# Patient Record
Sex: FEMALE | Race: WHITE | Employment: OTHER | URBAN - METROPOLITAN AREA
[De-identification: names, ages, dates, MRNs, and addresses within clinical notes are randomized per-mention and may not be internally consistent; named-entity substitution may affect disease eponyms.]

---

## 2024-10-07 ENCOUNTER — ORDER TRANSCRIPTION (OUTPATIENT)
Dept: ADMINISTRATIVE | Facility: HOSPITAL | Age: 64
End: 2024-10-07

## 2024-10-07 DIAGNOSIS — Z13.6 SCREENING FOR CARDIOVASCULAR CONDITION: Primary | ICD-10-CM

## 2025-01-15 ENCOUNTER — HOSPITAL ENCOUNTER (OUTPATIENT)
Dept: CT IMAGING | Facility: HOSPITAL | Age: 65
Discharge: HOME | End: 2025-01-15

## 2025-01-15 DIAGNOSIS — Z13.6 SCREENING FOR CARDIOVASCULAR CONDITION: ICD-10-CM

## 2025-01-15 LAB
POCT GLUCOSE CHOLESTECH: 74 (ref 70–99)
POCT HDL: 96 (ref 40–60)
POCT LDL: 152 (ref 0–99)
POCT TOTAL CHOLESTEROL: 259 (ref 110–200)
POCT TRIGLYCERIDES: 57 (ref 1–149)

## 2025-01-16 NOTE — PROGRESS NOTES
Date of Service 1/15/2025    ADRIENNE BOYD  Date of Birth 11/8/1960    Patient Age: 64 year old    PCP: No primary care provider on file.    Heart Scan Consult  Preliminary Heart Scan Score: 0    Previous Screening  Heart Scan Completed Previously: No        Peripheral Vascular Scan Completed Previously: No          Risk Factors  Personal Risk Factors  Non-alterable Risk Factors: Age;Gender;Family History (Reports, strong family history of heart disease in the family. Also reports, Mother had stent placements at age 55, and Father had stent placements in his 60s, and Grand Father on Father's side had a heart attack at 46.)  Alterable Risk Factors: Abnormal Cholesterol      Body Mass Index  There is no height or weight on file to calculate BMI.    Blood Pressure  There were no vitals taken for this visit.  (Normal =< 120/80,  Elevated = 120-129/ >80,  High Stage1 130-139/80-89 , Stage2 >140/>90)    Lipid Profile  Patient was in fasting state: No    Cholesterol: 259, done on 1/15/2025.  HDL Cholesterol: 96, done on 1/15/2025.  LDL Cholesterol: 152, done on 1/15/2025.  TriGlycerides 57, done on 1/15/2025.    Cholesterol Goals  Value   Total  =< 200   HDL  = > 45 Men = > 55 Women   LDL   =< 100   Triglycerides  =< 150       Glucose and Hemoglobin A1C  Lab Results   Component Value Date    PGLU 74 01/15/2025     (Normal Fasting Glucose < 100mg/dl )    Nurse Review  Risk factor information and results reviewed with Nurse: Yes    Recommended Follow Up:  Consult your physician regarding:: Final Heart Scan Report;Discuss Potential for Score Variance;Discuss potential for Incidental Finding      Recommendations for Change:  Nutrition Changes: Low Saturated Fat;Low Fat Dairy;Low Salt Eating;Increase Fiber    Cholesterol Modification (goal of therapy depends upon your risk): Decrease LDL (Lousy/Bad) Ideal <100;Decrease Total Normal <200    Exercise: No Change Needed    Smoking Cessation: No Change Needed (Not a smoker)               Repeat Heart Scan: Discuss with your Physician;5 years if Calcium Score is 0.0              [place] Recommended Resources:  Recommended Resources: Upcoming Classes, Medical Services and Health Library [email or web address]     Other Resources:: Handouts given - Controlling Risk Factors, and Cholesterol      Lizbeth MOLINA RN        Please Contact the Nurse Heart Line with any Questions or Concerns [phone].

## 2025-01-16 NOTE — PROGRESS NOTES
RN Note: Pt requested to have her Calcium Score Final result and Over Read be sent to Dr. Tami Hyatt's office ([place], [place]) and to Dr. Tommy Hunter ([facility]  [place], [place]) [place] signed by pt for both doctors.